# Patient Record
Sex: FEMALE | Race: WHITE | HISPANIC OR LATINO | URBAN - METROPOLITAN AREA
[De-identification: names, ages, dates, MRNs, and addresses within clinical notes are randomized per-mention and may not be internally consistent; named-entity substitution may affect disease eponyms.]

---

## 2020-08-17 ENCOUNTER — OFFICE VISIT (OUTPATIENT)
Dept: URBAN - METROPOLITAN AREA CLINIC 98 | Facility: CLINIC | Age: 45
End: 2020-08-17

## 2020-08-31 ENCOUNTER — OFFICE VISIT (OUTPATIENT)
Dept: URBAN - METROPOLITAN AREA CLINIC 98 | Facility: CLINIC | Age: 45
End: 2020-08-31

## 2020-08-31 NOTE — EXAM-PHYSICAL EXAM
General Examination   Constitutional: well-developed, normal communication ability.   Eyes: Conjunctivae and eyelids appear normal, no scleral icterus.   Nose/nasopharynx, external nose: appear normal, normal appearance of lips.   Neck/thyroid: normal appearance, no masses felt.   Chest: No lesions or deformities.   Cardiovascular: No edema, no murmurs or gallops appreciated.   Gastrointestinal: No scars, normoactive bowel sounds, soft, no tenderness, no rebound tenderness, no shifting dullness, no organomegaly.   Lymphatic: Neck without lymphadenopathy.   Musculoskeletal: normal gait and station, no tenderness present.   Skin: no rashes or jaundice. No tenderness on palpation.    Neurologic: Oriented to person, place, time. Normal sensation, short term memory intact.    Psychiatric: Normal mood and appropriate affect. today

## 2020-09-18 ENCOUNTER — OFFICE VISIT (OUTPATIENT)
Dept: URBAN - METROPOLITAN AREA CLINIC 98 | Facility: CLINIC | Age: 45
End: 2020-09-18
Payer: SELF-PAY

## 2020-09-18 VITALS
TEMPERATURE: 98.2 F | SYSTOLIC BLOOD PRESSURE: 121 MMHG | WEIGHT: 158.6 LBS | DIASTOLIC BLOOD PRESSURE: 79 MMHG | HEART RATE: 61 BPM | BODY MASS INDEX: 29.94 KG/M2 | HEIGHT: 61 IN

## 2020-09-18 DIAGNOSIS — R10.9 CHRONIC ABDOMINAL PAIN: ICD-10-CM

## 2020-09-18 DIAGNOSIS — K52.9 CHRONIC DIARRHEA: ICD-10-CM

## 2020-09-18 PROCEDURE — G9903 PT SCRN TBCO ID AS NON USER: HCPCS | Performed by: INTERNAL MEDICINE

## 2020-09-18 PROCEDURE — G8417 CALC BMI ABV UP PARAM F/U: HCPCS | Performed by: INTERNAL MEDICINE

## 2020-09-18 PROCEDURE — G8427 DOCREV CUR MEDS BY ELIG CLIN: HCPCS | Performed by: INTERNAL MEDICINE

## 2020-09-18 PROCEDURE — 99204 OFFICE O/P NEW MOD 45 MIN: CPT | Performed by: INTERNAL MEDICINE

## 2020-09-18 RX ORDER — DICYCLOMINE HYDROCHLORIDE 20 MG/1
1 TABLET TABLET ORAL
Qty: 40 | Refills: 0 | OUTPATIENT
Start: 2020-09-18 | End: 2020-10-02

## 2020-09-18 RX ORDER — SACCHAROMYCES BOULARDII 250 MG
1 CAPSULE CAPSULE ORAL TWICE A DAY
Status: ACTIVE | COMMUNITY

## 2020-09-18 NOTE — EXAM-PHYSICAL EXAM
Constitutional: well-developed, normal communication ability.   Eyes: Conjunctivae and eyelids appear normal, no scleral icterus.   Nose/nasopharynx, external nose: appear normal, normal appearance of lips.   Neck/thyroid: normal appearance, no masses felt.   Chest: No lesions or deformities. Respiratory: lungs clear to auscultation bilaterally, no rales or wheezing, no crackles   Cardiovascular: No edema, no murmurs or gallops appreciated.   Gastrointestinal: No scars, normoactive bowel sounds, soft, no tenderness, no rebound tenderness, no shifting dullness, no organomegaly.   Lymphatic: Neck without lymphadenopathy.   Musculoskeletal: normal gait and station, no tenderness present.   Skin: no rashes or jaundice. No tenderness on palpation.    Neurologic: Oriented to person, place, time. Normal sensation, short term memory intact.    Psychiatric: Normal mood and appropriate affect.

## 2020-09-18 NOTE — HPI-TODAY'S VISIT:
Ms. Devlin is a 44 yo F presenting for abdominal pain and diarrhea. She has pain and bloating in the stomach and diarrhea. She was hospitalized for right sided abdominal pain with possible appendicitis in Jul 27-29, 2020 and she was treated with antibiotics (cipro/flagyl) but did not have surgery. After that she began to have diarrhea and continued abdominal pain. Her stool is loose but not watery. She is having about 10 bowel movements per day. She saw some blood spots in her stool  in August. She does not have the diarrhea at night. She has associated right  lower quadrant pain, no nausea or vomiting and she has had 5 lbs weight loss since July. She says she has not been tested for infection. She is mostly eating salad due to the pain in her abdomen. She has had no recent travel or sick contacts. No fever or chills. She has never had endoscopy in the past. She is on a probiotic but has no new medications, herbs, supplements or vitamins.   CT A/P 8/11/2020:  Appendix is somewhat dilated and fluid-filled measuring 9.8 mm maximally. No appendicolith or evidence of periappendiceal edema or paracolic fat edema to suggest acute appendicitis. Small hiatla hernia. 5.3 cm left ovarian cyst. Diffuse thickening of the wall of the lower descending colon and sigmoid colon to the rectum.

## 2020-09-30 ENCOUNTER — OFFICE VISIT (OUTPATIENT)
Dept: URBAN - METROPOLITAN AREA CLINIC 82 | Facility: CLINIC | Age: 45
End: 2020-09-30
Payer: SELF-PAY

## 2020-09-30 DIAGNOSIS — R93.5 ABNORMAL CT OF THE ABDOMEN: ICD-10-CM

## 2020-09-30 DIAGNOSIS — K52.9 CHRONIC DIARRHEA: ICD-10-CM

## 2020-09-30 DIAGNOSIS — A04.72 CLOSTRIDIUM DIFFICILE COLITIS: ICD-10-CM

## 2020-09-30 DIAGNOSIS — R10.9 CHRONIC ABDOMINAL PAIN: ICD-10-CM

## 2020-09-30 PROCEDURE — G9903 PT SCRN TBCO ID AS NON USER: HCPCS | Performed by: INTERNAL MEDICINE

## 2020-09-30 PROCEDURE — 99214 OFFICE O/P EST MOD 30 MIN: CPT | Performed by: INTERNAL MEDICINE

## 2020-09-30 PROCEDURE — G8427 DOCREV CUR MEDS BY ELIG CLIN: HCPCS | Performed by: INTERNAL MEDICINE

## 2020-09-30 PROCEDURE — 43235 EGD DIAGNOSTIC BRUSH WASH: CPT | Performed by: INTERNAL MEDICINE

## 2020-09-30 RX ORDER — SACCHAROMYCES BOULARDII 250 MG
1 CAPSULE CAPSULE ORAL TWICE A DAY
Status: ACTIVE | COMMUNITY

## 2020-09-30 RX ORDER — DICYCLOMINE HYDROCHLORIDE 20 MG/1
1 TABLET TABLET ORAL
Qty: 40 | Refills: 0 | Status: ACTIVE | COMMUNITY
Start: 2020-09-18 | End: 2020-10-02

## 2020-09-30 RX ORDER — VANCOMYCIN HYDROCHLORIDE 125 MG/1
1 CAPSULE CAPSULE ORAL
Qty: 40 | OUTPATIENT
Start: 2020-09-30 | End: 2020-10-10

## 2020-09-30 NOTE — HPI-TODAY'S VISIT:
C/o persistent soft stools, and periumbilical and lower right abd pains, some relief w bentyl prn. Stool GPP positive for cdiff. Prior hx: She was hospitalized for right sided abdominal pain with possible appendicitis in Jul 27-29, 2020 and she was treated with antibiotics (cipro/flagyl) but did not have surgery. After that she began to have diarrhea and continued abdominal pain. Her stool is loose and soft. Up to 10 bowel movements per day. She saw some blood spots in her stool  in August. Associated abdominal pain, No fever.   Follow up CT A/P 8/11/2020:  Appendix is somewhat dilated and fluid-filled measuring 9.8 mm maximally. No appendicolith or evidence of periappendiceal edema or paracolic fat edema to suggest acute appendicitis. Small hiatla hernia. 5.3 cm left ovarian cyst. Diffuse thickening of the wall of the lower descending colon and sigmoid colon to the rectum.

## 2020-11-04 ENCOUNTER — OFFICE VISIT (OUTPATIENT)
Dept: URBAN - METROPOLITAN AREA CLINIC 115 | Facility: CLINIC | Age: 45
End: 2020-11-04
Payer: SELF-PAY

## 2020-11-04 ENCOUNTER — WEB ENCOUNTER (OUTPATIENT)
Dept: URBAN - METROPOLITAN AREA CLINIC 115 | Facility: CLINIC | Age: 45
End: 2020-11-04

## 2020-11-04 DIAGNOSIS — R10.31 RIGHT LOWER QUADRANT ABDOMINAL PAIN: ICD-10-CM

## 2020-11-04 DIAGNOSIS — E66.9 OBESITY (BMI 30.0-34.9): ICD-10-CM

## 2020-11-04 DIAGNOSIS — K52.9 CHRONIC DIARRHEA: ICD-10-CM

## 2020-11-04 DIAGNOSIS — R93.5 ABNORMAL MAGNETIC RESONANCE IMAGING OF ABDOMEN: ICD-10-CM

## 2020-11-04 PROCEDURE — 99213 OFFICE O/P EST LOW 20 MIN: CPT | Performed by: INTERNAL MEDICINE

## 2020-11-04 PROCEDURE — G8427 DOCREV CUR MEDS BY ELIG CLIN: HCPCS | Performed by: INTERNAL MEDICINE

## 2020-11-04 PROCEDURE — G9903 PT SCRN TBCO ID AS NON USER: HCPCS | Performed by: INTERNAL MEDICINE

## 2020-11-04 PROCEDURE — G8417 CALC BMI ABV UP PARAM F/U: HCPCS | Performed by: INTERNAL MEDICINE

## 2020-11-04 PROCEDURE — G8483 FLU IMM NO ADMIN DOC REA: HCPCS | Performed by: INTERNAL MEDICINE

## 2020-11-04 RX ORDER — SACCHAROMYCES BOULARDII 250 MG
1 CAPSULE CAPSULE ORAL TWICE A DAY
Status: ACTIVE | COMMUNITY

## 2020-11-04 NOTE — HPI-OTHER HISTORIES
Case of 46 y/o Female G0PO with recent appendicitis and C/diff colitis treated with 10 days acourse of vancomycin that came with RLQ pain w/o fever, N/V/D,melena or rectal bleeding. She is on schedule for EGD/COLO 11/13/2020 with .No tenderness on deep palpation.

## 2020-11-13 ENCOUNTER — CLAIMS CREATED FROM THE CLAIM WINDOW (OUTPATIENT)
Dept: URBAN - METROPOLITAN AREA CLINIC 4 | Facility: CLINIC | Age: 45
End: 2020-11-13
Payer: SELF-PAY

## 2020-11-13 ENCOUNTER — OFFICE VISIT (OUTPATIENT)
Dept: URBAN - METROPOLITAN AREA SURGERY CENTER 13 | Facility: SURGERY CENTER | Age: 45
End: 2020-11-13
Payer: SELF-PAY

## 2020-11-13 DIAGNOSIS — R19.7 ACUTE DIARRHEA: ICD-10-CM

## 2020-11-13 DIAGNOSIS — R10.33 ABDOMINAL PAIN, ACUTE, PERIUMBILICAL: ICD-10-CM

## 2020-11-13 DIAGNOSIS — K31.89 OTHER DISEASES OF STOMACH AND DUODENUM: ICD-10-CM

## 2020-11-13 DIAGNOSIS — K31.9 DISEASE OF STOMACH AND DUODENUM, UNSPECIFIED: ICD-10-CM

## 2020-11-13 DIAGNOSIS — K63.89 OTHER SPECIFIED DISEASES OF INTESTINE: ICD-10-CM

## 2020-11-13 DIAGNOSIS — R93.3 ABN FINDINGS-GI TRACT: ICD-10-CM

## 2020-11-13 DIAGNOSIS — K63.89 BACTERIAL OVERGROWTH SYNDROME: ICD-10-CM

## 2020-11-13 DIAGNOSIS — K31.89 ACQUIRED DEFORMITY OF DUODENUM: ICD-10-CM

## 2020-11-13 PROCEDURE — 43239 EGD BIOPSY SINGLE/MULTIPLE: CPT | Performed by: INTERNAL MEDICINE

## 2020-11-13 PROCEDURE — 45380 COLONOSCOPY AND BIOPSY: CPT | Performed by: INTERNAL MEDICINE

## 2020-11-13 PROCEDURE — G8907 PT DOC NO EVENTS ON DISCHARG: HCPCS | Performed by: INTERNAL MEDICINE

## 2020-11-13 PROCEDURE — G9937 DIG OR SURV COLSCO: HCPCS | Performed by: INTERNAL MEDICINE

## 2020-11-13 PROCEDURE — 88305 TISSUE EXAM BY PATHOLOGIST: CPT | Performed by: PATHOLOGY

## 2020-11-13 PROCEDURE — 88341 IMHCHEM/IMCYTCHM EA ADD ANTB: CPT | Performed by: PATHOLOGY

## 2020-11-13 PROCEDURE — 88313 SPECIAL STAINS GROUP 2: CPT | Performed by: PATHOLOGY

## 2020-11-13 PROCEDURE — 88342 IMHCHEM/IMCYTCHM 1ST ANTB: CPT | Performed by: PATHOLOGY

## 2020-11-13 RX ORDER — OMEPRAZOLE 40 MG/1
1 CAPSULE 30 MINUTES BEFORE MORNING MEAL CAPSULE, DELAYED RELEASE ORAL BID
Qty: 60 | Refills: 1 | OUTPATIENT

## 2020-11-13 RX ORDER — SACCHAROMYCES BOULARDII 250 MG
1 CAPSULE CAPSULE ORAL TWICE A DAY
Status: ACTIVE | COMMUNITY

## 2020-11-13 RX ORDER — SUCRALFATE 1 G/10ML
10 ML ON AN EMPTY STOMACH SUSPENSION ORAL TWICE A DAY
Qty: 600 | OUTPATIENT
Start: 2020-11-13 | End: 2020-12-13

## 2020-11-13 RX ORDER — SUCRALFATE 1 G
1 TABLET ON AN EMPTY STOMACH TABLET ORAL TWICE A DAY
Qty: 60 | OUTPATIENT
Start: 2020-11-13 | End: 2020-12-13

## 2020-12-14 ENCOUNTER — OFFICE VISIT (OUTPATIENT)
Dept: URBAN - METROPOLITAN AREA CLINIC 82 | Facility: CLINIC | Age: 45
End: 2020-12-14
Payer: SELF-PAY

## 2020-12-14 VITALS
RESPIRATION RATE: 14 BRPM | HEART RATE: 62 BPM | DIASTOLIC BLOOD PRESSURE: 80 MMHG | SYSTOLIC BLOOD PRESSURE: 127 MMHG | WEIGHT: 163 LBS | HEIGHT: 61 IN | BODY MASS INDEX: 30.78 KG/M2 | TEMPERATURE: 97.2 F

## 2020-12-14 DIAGNOSIS — R10.9 ABDOMINAL PAIN: ICD-10-CM

## 2020-12-14 DIAGNOSIS — K27.9 PUD (PEPTIC ULCER DISEASE): ICD-10-CM

## 2020-12-14 PROCEDURE — G8427 DOCREV CUR MEDS BY ELIG CLIN: HCPCS | Performed by: INTERNAL MEDICINE

## 2020-12-14 PROCEDURE — 99212 OFFICE O/P EST SF 10 MIN: CPT | Performed by: INTERNAL MEDICINE

## 2020-12-14 PROCEDURE — G9903 PT SCRN TBCO ID AS NON USER: HCPCS | Performed by: INTERNAL MEDICINE

## 2020-12-14 RX ORDER — OMEPRAZOLE 40 MG/1
1 CAPSULE 30 MINUTES BEFORE MORNING MEAL CAPSULE, DELAYED RELEASE ORAL BID
Qty: 60 | Refills: 1

## 2020-12-14 RX ORDER — OMEPRAZOLE 40 MG/1
1 CAPSULE 30 MINUTES BEFORE MORNING MEAL CAPSULE, DELAYED RELEASE ORAL BID
Qty: 60 | Refills: 1 | Status: ACTIVE | COMMUNITY

## 2020-12-14 RX ORDER — SACCHAROMYCES BOULARDII 250 MG
1 CAPSULE CAPSULE ORAL TWICE A DAY
Status: ACTIVE | COMMUNITY

## 2020-12-14 NOTE — HPI-OTHER HISTORIES
EGD w duodenal ulcers, no hpylori. Colonoscopy with no ileitis/colitis, +hemorrhoids. hx Cdiff treated w vanco. Nml BM. On PPI. Occ periumbilical pains, w spicy foods or coffee. Denies nsaids.

## 2021-12-21 ENCOUNTER — LAB OUTSIDE AN ENCOUNTER (OUTPATIENT)
Dept: URBAN - METROPOLITAN AREA CLINIC 115 | Facility: CLINIC | Age: 46
End: 2021-12-21

## 2021-12-21 ENCOUNTER — OFFICE VISIT (OUTPATIENT)
Dept: URBAN - METROPOLITAN AREA CLINIC 115 | Facility: CLINIC | Age: 46
End: 2021-12-21
Payer: SELF-PAY

## 2021-12-21 VITALS
SYSTOLIC BLOOD PRESSURE: 117 MMHG | TEMPERATURE: 98.2 F | WEIGHT: 162 LBS | RESPIRATION RATE: 14 BRPM | BODY MASS INDEX: 30.58 KG/M2 | DIASTOLIC BLOOD PRESSURE: 82 MMHG | HEART RATE: 77 BPM | HEIGHT: 61 IN

## 2021-12-21 DIAGNOSIS — R10.13 EPIGASTRIC PAIN: ICD-10-CM

## 2021-12-21 DIAGNOSIS — K27.9 PUD (PEPTIC ULCER DISEASE): ICD-10-CM

## 2021-12-21 DIAGNOSIS — E66.9 OBESITY (BMI 30.0-34.9): ICD-10-CM

## 2021-12-21 PROBLEM — 79922009: Status: ACTIVE | Noted: 2021-12-21

## 2021-12-21 PROCEDURE — 99213 OFFICE O/P EST LOW 20 MIN: CPT | Performed by: INTERNAL MEDICINE

## 2021-12-21 RX ORDER — SUCRALFATE 1 G/1
1 TABLET ON AN EMPTY STOMACH TABLET ORAL TWICE A DAY
Qty: 60 | OUTPATIENT
Start: 2021-12-21 | End: 2022-01-20

## 2021-12-21 RX ORDER — PANTOPRAZOLE SODIUM 40 MG/1
1 TABLET TABLET, DELAYED RELEASE ORAL ONCE A DAY
Qty: 30 | OUTPATIENT
Start: 2021-12-21

## 2021-12-21 RX ORDER — SACCHAROMYCES BOULARDII 250 MG
1 CAPSULE CAPSULE ORAL TWICE A DAY
Status: ACTIVE | COMMUNITY

## 2021-12-21 RX ORDER — OMEPRAZOLE 40 MG/1
1 CAPSULE 30 MINUTES BEFORE MORNING MEAL CAPSULE, DELAYED RELEASE ORAL BID
Qty: 60 | Refills: 1 | Status: ACTIVE | COMMUNITY

## 2021-12-21 NOTE — PHYSICAL EXAM GASTROINTESTINAL
normal bowel sounds , no masses palpable , no guarding or rigidity , soft, mild tender on epigastrum, nondistended

## 2021-12-21 NOTE — HPI-TODAY'S VISIT:
45 y/o  female  with obesity, HLD, PUD on duodenum on EGD ,normocytic anemia that came for eval due to chronic periumbilical abdominal pain for the past few months. CT scan was reviewed and did not shows gallstones or pancreas problems. Labs shows mild anemia that could be secondary to abnormal mesntrual periods. EGD and colonoscopy 2020 with duodenitis and ulcers.Colonoscopy with internal hemorrhoids. No smoke,no drink alcohol.No family hx of CRC.

## 2021-12-27 ENCOUNTER — OFFICE VISIT (OUTPATIENT)
Dept: URBAN - METROPOLITAN AREA SURGERY CENTER 13 | Facility: SURGERY CENTER | Age: 46
End: 2021-12-27
Payer: SELF-PAY

## 2021-12-27 ENCOUNTER — CLAIMS CREATED FROM THE CLAIM WINDOW (OUTPATIENT)
Dept: URBAN - METROPOLITAN AREA CLINIC 4 | Facility: CLINIC | Age: 46
End: 2021-12-27
Payer: SELF-PAY

## 2021-12-27 ENCOUNTER — TELEPHONE ENCOUNTER (OUTPATIENT)
Dept: URBAN - METROPOLITAN AREA CLINIC 82 | Facility: CLINIC | Age: 46
End: 2021-12-27

## 2021-12-27 DIAGNOSIS — K31.89 ACQUIRED DEFORMITY OF DUODENUM: ICD-10-CM

## 2021-12-27 DIAGNOSIS — R10.33 ABDOMINAL PAIN, ACUTE, PERIUMBILICAL: ICD-10-CM

## 2021-12-27 DIAGNOSIS — R10.13 ABDOMINAL DISCOMFORT, EPIGASTRIC: ICD-10-CM

## 2021-12-27 DIAGNOSIS — K31.89 DUODENAL ERYTHEMA: ICD-10-CM

## 2021-12-27 PROCEDURE — 88342 IMHCHEM/IMCYTCHM 1ST ANTB: CPT | Performed by: PATHOLOGY

## 2021-12-27 PROCEDURE — G8907 PT DOC NO EVENTS ON DISCHARG: HCPCS | Performed by: INTERNAL MEDICINE

## 2021-12-27 PROCEDURE — 88305 TISSUE EXAM BY PATHOLOGIST: CPT | Performed by: PATHOLOGY

## 2021-12-27 PROCEDURE — 88312 SPECIAL STAINS GROUP 1: CPT | Performed by: PATHOLOGY

## 2021-12-27 PROCEDURE — 43239 EGD BIOPSY SINGLE/MULTIPLE: CPT | Performed by: INTERNAL MEDICINE

## 2021-12-27 RX ORDER — PANTOPRAZOLE SODIUM 40 MG/1
1 TABLET TABLET, DELAYED RELEASE ORAL ONCE A DAY
Qty: 30 | Status: ACTIVE | COMMUNITY
Start: 2021-12-21

## 2021-12-27 RX ORDER — SACCHAROMYCES BOULARDII 250 MG
1 CAPSULE CAPSULE ORAL TWICE A DAY
Status: ACTIVE | COMMUNITY

## 2021-12-27 RX ORDER — SUCRALFATE 1 G/1
1 TABLET ON AN EMPTY STOMACH TABLET ORAL TWICE A DAY
Qty: 60 | Status: ACTIVE | COMMUNITY
Start: 2021-12-21 | End: 2022-01-20

## 2021-12-27 RX ORDER — OMEPRAZOLE 40 MG/1
1 CAPSULE 30 MINUTES BEFORE MORNING MEAL CAPSULE, DELAYED RELEASE ORAL BID
Qty: 60 | Refills: 1 | Status: ACTIVE | COMMUNITY

## 2022-02-08 ENCOUNTER — DASHBOARD ENCOUNTERS (OUTPATIENT)
Age: 47
End: 2022-02-08

## 2022-02-08 ENCOUNTER — OFFICE VISIT (OUTPATIENT)
Dept: URBAN - METROPOLITAN AREA CLINIC 115 | Facility: CLINIC | Age: 47
End: 2022-02-08
Payer: SELF-PAY

## 2022-02-08 VITALS
TEMPERATURE: 98.1 F | RESPIRATION RATE: 14 BRPM | WEIGHT: 165 LBS | HEART RATE: 48 BPM | SYSTOLIC BLOOD PRESSURE: 118 MMHG | BODY MASS INDEX: 31.15 KG/M2 | HEIGHT: 61 IN | DIASTOLIC BLOOD PRESSURE: 72 MMHG

## 2022-02-08 DIAGNOSIS — E66.9 OBESITY (BMI 30.0-34.9): ICD-10-CM

## 2022-02-08 DIAGNOSIS — K27.9 PUD (PEPTIC ULCER DISEASE): ICD-10-CM

## 2022-02-08 PROBLEM — 443371000124107: Status: ACTIVE | Noted: 2020-11-04

## 2022-02-08 PROBLEM — 13200003 PEPTIC ULCER DISEASE: Status: ACTIVE | Noted: 2020-12-14

## 2022-02-08 PROCEDURE — 99213 OFFICE O/P EST LOW 20 MIN: CPT | Performed by: INTERNAL MEDICINE

## 2022-02-08 RX ORDER — OMEPRAZOLE 40 MG/1
1 CAPSULE 30 MINUTES BEFORE MORNING MEAL CAPSULE, DELAYED RELEASE ORAL BID
Qty: 60 | Refills: 1 | Status: ACTIVE | COMMUNITY

## 2022-02-08 RX ORDER — PANTOPRAZOLE SODIUM 40 MG/1
1 TABLET TABLET, DELAYED RELEASE ORAL ONCE A DAY
Qty: 30 | Status: ACTIVE | COMMUNITY
Start: 2021-12-21

## 2022-02-08 RX ORDER — SACCHAROMYCES BOULARDII 250 MG
1 CAPSULE CAPSULE ORAL TWICE A DAY
Status: ACTIVE | COMMUNITY

## 2022-02-08 NOTE — HPI-TODAY'S VISIT:
46 y/o  female with GERD and abdominal pain. Refer felling better after PPI.No alarm symptoms or signs reported today.EGD report discussed with her